# Patient Record
Sex: MALE | Race: WHITE | URBAN - METROPOLITAN AREA
[De-identification: names, ages, dates, MRNs, and addresses within clinical notes are randomized per-mention and may not be internally consistent; named-entity substitution may affect disease eponyms.]

---

## 2018-04-06 ENCOUNTER — OFFICE VISIT (OUTPATIENT)
Dept: ORTHOPEDIC SURGERY | Age: 53
End: 2018-04-06

## 2018-04-06 VITALS — HEIGHT: 74 IN | BODY MASS INDEX: 35.68 KG/M2 | WEIGHT: 278 LBS

## 2018-04-06 DIAGNOSIS — M17.0 PRIMARY OSTEOARTHRITIS OF BOTH KNEES: ICD-10-CM

## 2018-04-06 DIAGNOSIS — M25.562 PAIN IN BOTH KNEES, UNSPECIFIED CHRONICITY: Primary | ICD-10-CM

## 2018-04-06 DIAGNOSIS — M25.561 PAIN IN BOTH KNEES, UNSPECIFIED CHRONICITY: Primary | ICD-10-CM

## 2018-04-06 PROCEDURE — 99203 OFFICE O/P NEW LOW 30 MIN: CPT | Performed by: ORTHOPAEDIC SURGERY

## 2018-04-06 RX ORDER — LISINOPRIL 20 MG/1
20 TABLET ORAL
COMMUNITY
Start: 2017-11-14

## 2018-04-06 RX ORDER — SODIUM PHOSPHATE,MONO-DIBASIC 19G-7G/118
ENEMA (ML) RECTAL
COMMUNITY
Start: 2013-11-26

## 2018-04-06 RX ORDER — CETIRIZINE HYDROCHLORIDE 10 MG/1
10 TABLET ORAL
COMMUNITY
Start: 2014-05-02

## 2018-04-06 RX ORDER — METFORMIN HYDROCHLORIDE 500 MG/1
TABLET, EXTENDED RELEASE ORAL
COMMUNITY
Start: 2017-11-14

## 2018-04-06 RX ORDER — SIMVASTATIN 20 MG
20 TABLET ORAL
COMMUNITY
Start: 2017-11-14

## 2018-04-06 RX ORDER — ASPIRIN 325 MG
325 TABLET ORAL
COMMUNITY
Start: 2017-05-04

## 2019-05-17 ENCOUNTER — OFFICE VISIT (OUTPATIENT)
Dept: ORTHOPEDIC SURGERY | Age: 54
End: 2019-05-17
Payer: COMMERCIAL

## 2019-05-17 VITALS — BODY MASS INDEX: 35.68 KG/M2 | WEIGHT: 278 LBS | HEIGHT: 74 IN

## 2019-05-17 DIAGNOSIS — M25.562 ACUTE PAIN OF LEFT KNEE: Primary | ICD-10-CM

## 2019-05-17 DIAGNOSIS — S83.242D TEAR OF MEDIAL MENISCUS OF LEFT KNEE, UNSPECIFIED TEAR TYPE, UNSPECIFIED WHETHER OLD OR CURRENT TEAR, SUBSEQUENT ENCOUNTER: ICD-10-CM

## 2019-05-17 PROCEDURE — 99214 OFFICE O/P EST MOD 30 MIN: CPT | Performed by: ORTHOPAEDIC SURGERY

## 2019-05-17 NOTE — PROGRESS NOTES
Chief Complaint    Pain (LEFT KNEE)      History of Present Illness:  Jaz Logan is a 47 y.o. male. He is here for evaluation of his left knee. States that he injured his left knee a few months ago while he is playing platform tennis. He states while he is playing he feels like at some point time he twisted his knee and his had persistent medial sided left knee pain ever since then. Does have some feelings of instability like his knee was hyperextended times when he is walking. He is getting some catching over the medial side of the knee. He does have some mild arthritis within the knee and recently did have Visco injections back in December of last year. He did get significant relief with those injections. States is been a definite change within the left knee since this new injury. Medical History:  Patient's medications, allergies, past medical, surgical, social and family histories were reviewed and updated as appropriate. Review of Systems:  Pertinent items are noted in HPI  Review of systems reviewed from Patient History Form dated on 5/17/19 and available in the patient's chart under the Media tab. Vital Signs:  Ht 6' 2.02\" (1.88 m)   Wt 278 lb (126.1 kg)   BMI 35.68 kg/m²     General Exam:   Constitutional: Patient is adequately groomed with no evidence of malnutrition  DTRs: Deep tendon reflexes are intact  Mental Status: The patient is oriented to time, place and person. The patient's mood and affect are appropriate. Lymphatic: The lymphatic examination bilaterally reveals all areas to be without enlargement or induration. Knee Examination:    Inspection:  No significant swelling erythema noted to the left knee today    Palpation:  Is tenderness palpation primarily along the medial joint line. Mild palpable effusion today within the knee.     Range of Motion:  Extension of the knee is 0° and painful at full extension, knee flexion is 130°    Strength:  He is able to do a straight leg raise    Special Tests:  Negative Lachman exam.  No instability to varus and valgus stress testing. Negative posterior drawer. There is pain reproduction with [facility] exam    Skin: There are no rashes, ulcerations or lesions. Gait: He is walking with an antalgic gait      Additional Comments:       Additional Examinations:         Right Lower Extremity: Examination of the right lower extremity does not show any tenderness, deformity or injury. Range of motion is unremarkable. There is no gross instability. There are no rashes, ulcerations or lesions. Strength and tone are normal.  Thoracic Spine: Examination of the thoracic spine does not show any tenderness, deformity or injury. Range of motion is unremarkable. There is no gross instability. There are no rashes, ulcerations or lesions. Strength and tone are normal.  Lower Back: Examination of the lower back does not show any tenderness, deformity or injury. Range of motion is unremarkable. There is no gross instability. There are no rashes, ulcerations or lesions. Strength and tone are normal.    Radiology:     X-rays obtained and reviewed in office:  Views 4 views of left knee does demonstrate some mild narrowing primarily within the medial joint space with osteophyte formation. He does have some mild narrowing of that medial joint space. Assessment :  Left knee concern for medial meniscus tear    Impression:  Encounter Diagnoses   Name Primary?  Acute pain of left knee Yes    Tear of medial meniscus of left knee, unspecified tear type, unspecified whether old or current tear, subsequent encounter        Office Procedures:  Orders Placed This Encounter   Procedures    XR KNEE LEFT (MIN 4 VIEWS)     Standing Status:   Future     Number of Occurrences:   1     Standing Expiration Date:   5/17/2020       Treatment Plan:  I discussed the diagnosis and treatment options with him today.   I would recommend at this time getting an MRI of the knee to rule out medial meniscus tear. He is agreeable with that plan. I'll see him back once MRI is completed to go over those results. He will continue use ice and anti-inflammatories as needed.

## 2019-08-09 ENCOUNTER — OFFICE VISIT (OUTPATIENT)
Dept: ORTHOPEDIC SURGERY | Age: 54
End: 2019-08-09
Payer: COMMERCIAL

## 2019-08-09 VITALS — HEIGHT: 74 IN | WEIGHT: 278 LBS | BODY MASS INDEX: 35.68 KG/M2 | RESPIRATION RATE: 16 BRPM

## 2019-08-09 DIAGNOSIS — M17.12 PRIMARY OSTEOARTHRITIS OF LEFT KNEE: Primary | ICD-10-CM

## 2019-08-09 PROCEDURE — 99213 OFFICE O/P EST LOW 20 MIN: CPT | Performed by: ORTHOPAEDIC SURGERY

## 2019-08-09 NOTE — PROGRESS NOTES
compartment.       A complex tear of the posterior horn and body of the medial meniscus is present with a radial    component within the posterior horn (coronal PD SPIR series 401, image 7).     A probable small oblique undersurface tear of the posterior horn body junction of the lateral    meniscus is present (sagittal T1 series 501, image 7, corresponding sagittal PD SPIR series    601, image 6 and coronal PD SPIR series 401, image 16).     The ACL, PCL, medial and lateral collateral ligaments and extensor mechanism are intact.       A large knee effusion is present.  No Baker's cyst.                                           CONCLUSION:   1. Medial compartmental osteoarthritis with high-grade chondromalacia within the weightbearing    medial femoral condyle and medial aspect of the medial tibial plateau with underlying    subchondral bone marrow reaction and small-to-moderate-sized medial compartmental spurs. 2. Complex tear of the posterior horn and body of the medial meniscus. 3. Patellofemoral osteoarthritis with high-grade chondromalacia involving the trochlear    articular cartilage with mild underlying subchondral bone marrow reaction and moderate-grade    chondromalacia involving the patellar apex with mild patellofemoral spurring. 4. Probable small oblique undersurface tear of the posterior horn body junction of the lateral    meniscus. 5. Large knee effusion. Assessment : Left knee osteoarthritis    Impression:  Encounter Diagnosis   Name Primary?  Primary osteoarthritis of left knee Yes       Office Procedures:  No orders of the defined types were placed in this encounter. Treatment Plan: Primary finding on the MRI is that he does have more severe arthritis than what we could see on x-ray.   I would recommend at this time treatment for arthritis with continuation of low impact exercise as well as cortisone and Visco injections as needed as well as use of anti-inflammatories. He wants to consider repeat Visco injections as he did have last December and does feel like they have been helpful.   He will call us if he does wish to proceed

## 2019-09-24 DIAGNOSIS — M17.12 PRIMARY OSTEOARTHRITIS OF LEFT KNEE: Primary | ICD-10-CM

## 2019-10-03 ENCOUNTER — TELEPHONE (OUTPATIENT)
Dept: ORTHOPEDIC SURGERY | Age: 54
End: 2019-10-03

## 2020-01-08 ENCOUNTER — TELEPHONE (OUTPATIENT)
Dept: ORTHOPEDIC SURGERY | Age: 55
End: 2020-01-08

## 2023-02-23 ENCOUNTER — ANESTHESIA EVENT (OUTPATIENT)
Dept: ENDOSCOPY | Age: 58
End: 2023-02-23
Payer: COMMERCIAL

## 2023-02-23 ENCOUNTER — ANESTHESIA (OUTPATIENT)
Dept: ENDOSCOPY | Age: 58
End: 2023-02-23
Payer: COMMERCIAL

## 2023-02-23 ENCOUNTER — HOSPITAL ENCOUNTER (OUTPATIENT)
Age: 58
Setting detail: OUTPATIENT SURGERY
Discharge: HOME | End: 2023-02-23
Attending: INTERNAL MEDICINE | Admitting: INTERNAL MEDICINE
Payer: COMMERCIAL

## 2023-02-23 VITALS
HEART RATE: 72 BPM | DIASTOLIC BLOOD PRESSURE: 77 MMHG | SYSTOLIC BLOOD PRESSURE: 147 MMHG | OXYGEN SATURATION: 97 % | RESPIRATION RATE: 16 BRPM | TEMPERATURE: 97.6 F | WEIGHT: 280 LBS | BODY MASS INDEX: 35.94 KG/M2 | HEIGHT: 74 IN

## 2023-02-23 DIAGNOSIS — Z86.010 HISTORY OF COLON POLYPS: ICD-10-CM

## 2023-02-23 PROCEDURE — 88305 TISSUE EXAM BY PATHOLOGIST: CPT

## 2023-02-23 PROCEDURE — 2580000003 HC RX 258: Performed by: FAMILY MEDICINE

## 2023-02-23 PROCEDURE — 3700000000 HC ANESTHESIA ATTENDED CARE: Performed by: INTERNAL MEDICINE

## 2023-02-23 PROCEDURE — 3609010600 HC COLONOSCOPY POLYPECTOMY SNARE/COLD BIOPSY: Performed by: INTERNAL MEDICINE

## 2023-02-23 PROCEDURE — 7100000010 HC PHASE II RECOVERY - FIRST 15 MIN: Performed by: INTERNAL MEDICINE

## 2023-02-23 PROCEDURE — 7100000011 HC PHASE II RECOVERY - ADDTL 15 MIN: Performed by: INTERNAL MEDICINE

## 2023-02-23 PROCEDURE — 3700000001 HC ADD 15 MINUTES (ANESTHESIA): Performed by: INTERNAL MEDICINE

## 2023-02-23 PROCEDURE — 2709999900 HC NON-CHARGEABLE SUPPLY: Performed by: INTERNAL MEDICINE

## 2023-02-23 PROCEDURE — 6360000002 HC RX W HCPCS: Performed by: NURSE ANESTHETIST, CERTIFIED REGISTERED

## 2023-02-23 RX ORDER — SODIUM CHLORIDE, SODIUM LACTATE, POTASSIUM CHLORIDE, CALCIUM CHLORIDE 600; 310; 30; 20 MG/100ML; MG/100ML; MG/100ML; MG/100ML
INJECTION, SOLUTION INTRAVENOUS CONTINUOUS
Status: DISCONTINUED | OUTPATIENT
Start: 2023-02-23 | End: 2023-02-23 | Stop reason: HOSPADM

## 2023-02-23 RX ORDER — LIDOCAINE HYDROCHLORIDE 20 MG/ML
INJECTION, SOLUTION INTRAVENOUS PRN
Status: DISCONTINUED | OUTPATIENT
Start: 2023-02-23 | End: 2023-02-23 | Stop reason: SDUPTHER

## 2023-02-23 RX ORDER — PROPOFOL 10 MG/ML
INJECTION, EMULSION INTRAVENOUS CONTINUOUS PRN
Status: DISCONTINUED | OUTPATIENT
Start: 2023-02-23 | End: 2023-02-23 | Stop reason: SDUPTHER

## 2023-02-23 RX ORDER — PROPOFOL 10 MG/ML
INJECTION, EMULSION INTRAVENOUS PRN
Status: DISCONTINUED | OUTPATIENT
Start: 2023-02-23 | End: 2023-02-23 | Stop reason: SDUPTHER

## 2023-02-23 RX ADMIN — PROPOFOL 150 MCG/KG/MIN: 10 INJECTION, EMULSION INTRAVENOUS at 12:52

## 2023-02-23 RX ADMIN — PROPOFOL 50 MG: 10 INJECTION, EMULSION INTRAVENOUS at 12:53

## 2023-02-23 RX ADMIN — PROPOFOL 100 MG: 10 INJECTION, EMULSION INTRAVENOUS at 12:52

## 2023-02-23 RX ADMIN — PROPOFOL 50 MG: 10 INJECTION, EMULSION INTRAVENOUS at 12:55

## 2023-02-23 RX ADMIN — SODIUM CHLORIDE, POTASSIUM CHLORIDE, SODIUM LACTATE AND CALCIUM CHLORIDE: 600; 310; 30; 20 INJECTION, SOLUTION INTRAVENOUS at 11:27

## 2023-02-23 RX ADMIN — LIDOCAINE HYDROCHLORIDE 60 MG: 20 INJECTION, SOLUTION INTRAVENOUS at 12:52

## 2023-02-23 ASSESSMENT — PAIN - FUNCTIONAL ASSESSMENT: PAIN_FUNCTIONAL_ASSESSMENT: 0-10

## 2023-02-23 NOTE — DISCHARGE INSTRUCTIONS
ENDOSCOPY DISCHARGE INSTRUCTIONS:    Call the physician that did your procedure for any questions or concern:    [facility]: [phone]  DR. DIANE CEE      ACTIVITY:    There are potential side effects to the medications used for sedation and anesthesia during your procedure. These include:  Dizziness or light-headedness, confusion or memory loss, delayed reaction times, loss of coordination, nausea and vomiting. Because of your increased risk for injury, we ask that you observe the following precautions: For the next 24 hours,  DO NOT operate an automobile, bicycle, motorcycle, , power tools or large equipment of any kind. Do not drink alcohol, sign any legal documents or make any legal decisions for 24 hours. Do not bend your head over lower than your heart. DO sit on the side of bed/couch awhile before getting up. Plan on bedrest or quiet relaxation today. You may resume normal activities in 24 hours. DIET:    Your first meal today should be light, avoiding spicy and fatty foods. If you tolerate this first meal, then you may advance to your regular diet unless otherwise advised by your physician. NORMAL SYMPTOMS:  -Mild sore throat if youve had an EGD   -Gaseous discomfort    NOTIFY YOUR PHYSICIAN IF THESE SYMPTOMS OCCUR:  1. Fever (greater than 100)  5. Increased abdominal bloating  2. Severe pain    6. Excessive bleeding  3. Nausea and vomiting  7. Chest pain                                                                    4. Chills    8. Shortness of breath    ADDITIONAL INSTRUCTIONS:    Biopsy results: Call [address],[facility] for biopsy results in 1 week    Educational Information:    POSTOPERATIVE DIAGNOSIS:    -4 small colon polyps removed and retrieved with cold snare.  -Mild sigmoid diverticulosis. -Otherwise unremarkable exam to the cecum.      PLAN:   -We will follow-up with biopsy results and update the patient in a week or 2 via the patient portal.  Anticipate repeat colonoscopy in 3 years assuming favorable biopsy results.  -High-fiber diet  -Okay to resume Eliquis tomorrow        Please review these discharge instructions this evening or tomorrow for  information you may have forgotten. We want to thank you for choosing the [facility] as your health care provider. We always strive to provide you with excellent care while you are here. You may receive a survey in the mail regarding your care. We would appreciate you taking a few minutes of your time to complete this survey.

## 2023-02-23 NOTE — ANESTHESIA POSTPROCEDURE EVALUATION
Department of Anesthesiology  Postprocedure Note    Patient: Tamra Rodrigues  MRN: [id number]  YOB: 1965  Date of evaluation: 2/23/2023      Procedure Summary     Date: 02/23/23 Room / Location: [place] [address] / [facility]    Anesthesia Start: 3032 Anesthesia Stop: 6965    Procedure: COLONOSCOPY POLYPECTOMY SNARE/COLD BIOPSY Diagnosis:       History of colon polyps      (History of colon polyps [Z86.010])    Surgeons: Yosi Chen MD Responsible Provider: Caroline Vargas MD    Anesthesia Type: MAC ASA Status: 2          Anesthesia Type: No value filed.     Francisca Phase I: Francisca Score: 10    Francisca Phase II: Francisca Score: 10      Anesthesia Post Evaluation    Patient location during evaluation: PACU  Level of consciousness: awake  Complications: no  Multimodal analgesia pain management approach

## 2023-02-23 NOTE — PROGRESS NOTES
Ambulatory Surgery/Procedure Discharge Note    Vitals:    02/23/23 1347   BP: (!) 147/77   Pulse: 72   Resp: 16   Temp:    SpO2: 97%   T 97.6 temporal  Pt neets criteria per Francisca score. In: 800 [I.V.:800]  Out: -     Restroom use offered before discharge. Yes    Pain assessment:  none  Pain Level: 0    Pt and S.O./family states \"ready to go home\". Pt alert and oriented x4. IV removed. Denies N/V or pain. Discharge instructions given to pt and wife with pt permission. Pt and wife verbalized understanding of all instructions. Left with all belongings and discharge instructions. Patient discharged to home/self care.  Patient discharged via wheel chair by transporter to waiting family/S.O.       2/23/2023 1:49 PM

## 2023-02-23 NOTE — PROCEDURES
COLONOSCOPY     Patient: Chencho Flowers MRN: [id number]   YOB: 1965 Age: 65 y.o. Sex: male   Unit: [facility] ENDOSCOPY Room/Bed: [facility]/NONE Location: [address] [place] [address]    Admitting Physician: Jodi Whitehead     Primary Care Physician: Herbert Ledezma      DATE OF PROCEDURE: 2/23/2023  PROCEDURE: Colonoscopy    PREOPERATIVE DIAGNOSIS: History of colon polyps [Z86.010]  HPI: Screening colonoscopy, referred by Dr. Iris Dillard. 52-year-old man history of DM, DVT on lifelong Eliquis, personal history of colon polyps. His first colonoscopy was in 2016 with 3 small or medium sized polyps. Last colonoscopy was in 2019 with similar findings and a 3-year recall recommended. He denies any GI complaints. There     ENDOSCOPIST: Xander Noland MD    POSTOPERATIVE DIAGNOSIS:    -4 small colon polyps removed and retrieved with cold snare.  -Mild sigmoid diverticulosis. -Otherwise unremarkable exam to the cecum. PLAN:   -We will follow-up with biopsy results and update the patient in a week or 2 via the patient portal.  Anticipate repeat colonoscopy in 3 years assuming favorable biopsy results.  -High-fiber diet  -Okay to resume Eliquis tomorrow. INFORMED CONSENT:  Informed consent for colonoscopy was obtained. The benefits and risks including adverse medicine reaction and perforation have been explained. The patient's questions were answered and the patient agreed to proceed. ASA: ASA 3 - Patient with moderate systemic disease with functional limitations     SEDATION: MAC    The patient's vital signs, cardiac status, pulmonary status, abdominal status and mental status were stable for the procedure. The patient's vital signs and respiratory function as monitored by oxygen saturation remained stable. COLON PREPARATION:  The patient was given a split colon preparation and the preparation was adequate. Procedure Details: An anal exam was performed and this was unremarkable.  A digital rectal exam was performed and no masses palpated. The Olympus videocolonoscope  was inserted in the rectum and carefully advanced to the cecum as identified by IC valve, crow's foot appearance and appendix. The cecum was photodocumented. The colonoscope was slowly withdrawn and retrograde examination of the colon was carefully performed with inspection around and between folds. The ascending colon and cecum were intubated twice with repeat antegrade and retrograde examination. Retroflexion was performed in the right colon. Retroflexion in the rectum was performed. Cecum Intubated: Yes    Findings: The cecum including the appendix and ileocecal valve are notable for a small 2 mm cecal polyp removed and retrieved with a cold snare. The ascending colon contained a small 3 mm polyp removed and retrieved with a cold snare. In the transverse colon, there was another small 3 mm polyp removed and retrieved with a cold snare. There was mild sigmoid diverticulosis. In the sigmoid there was also a small 3 mm polyp removed and retrieved with a cold snare.     Estimated Blood Loss:  Minimal   Complications: None    Signed By: Litzy Bacon MD

## 2023-02-23 NOTE — ANESTHESIA PRE PROCEDURE
Department of Anesthesiology  Preprocedure Note       Name:  Benjamin Mesa   Age:  65 y.o.  :  1965                                          MRN:  [id number]         Date:  2023      Surgeon: Judy Beauchamp):  Justin Montana MD    Procedure: Procedure(s):  COLONOSCOPY    Medications prior to admission:   Prior to Admission medications    Medication Sig Start Date End Date Taking? Authorizing Provider   aspirin 325 MG tablet Take 325 mg by mouth  Patient not taking: Reported on 2023   Historical Provider, MD   cetirizine (ZYRTEC) 10 MG tablet Take 10 mg by mouth 14   Historical Provider, MD   apixaban (ELIQUIS) 5 MG TABS tablet TAKE 1 TABLET TWICE A DAY 3/29/18   Historical Provider, MD   glucosamine-chondroitin 500-400 MG CAPS 3 po qd  Patient not taking: Reported on 13   Historical Provider, MD   lisinopril (PRINIVIL;ZESTRIL) 20 MG tablet Take 20 mg by mouth 17   Historical Provider, MD   metFORMIN (GLUCOPHAGE-XR) 500 MG extended release tablet 2 po BID 17   Historical Provider, MD   simvastatin (ZOCOR) 20 MG tablet Take 20 mg by mouth 17   Historical Provider, MD   Omega-3 Fatty Acids (FISH OIL PO) Take by mouth  Patient not taking: Reported on 2023    Historical Provider, MD       Current medications:    Current Facility-Administered Medications   Medication Dose Route Frequency Provider Last Rate Last Admin    lactated ringers IV soln infusion   IntraVENous Continuous Katelynn Lopez MD 50 mL/hr at 23 1127 New Bag at 23 1127       Allergies:  No Known Allergies    Problem List:  There is no problem list on file for this patient.       Past Medical History:        Diagnosis Date    Diabetes mellitus ([address]     Hyperlipidemia     Hypertension        Past Surgical History:        Procedure Laterality Date    COLONOSCOPY      KNEE ARTHROSCOPY Right        Social History:    Social History     Tobacco Use    Smoking status: Never    Smokeless tobacco: Never   Substance Use Topics    Alcohol use: Yes     Comment: 15 servings a week                                Counseling given: Not Answered      Vital Signs (Current):   Vitals:    02/23/23 1054   BP: (!) 162/83   Pulse: 73   Resp: 16   Temp: 97.6 °F (36.4 °C)   TempSrc: Temporal   SpO2: 98%   Weight: 280 lb (127 kg)   Height: 6' 2\" (1.88 m)                                              BP Readings from Last 3 Encounters:   02/23/23 (!) 162/83       NPO Status: Time of last liquid consumption: 0700                        Time of last solid consumption: 1700                        Date of last liquid consumption: 02/23/23                        Date of last solid food consumption: 02/21/23    BMI:   Wt Readings from Last 3 Encounters:   02/23/23 280 lb (127 kg)   08/09/19 278 lb (126.1 kg)   05/17/19 278 lb (126.1 kg)     Body mass index is 35.95 kg/m². CBC: No results found for: WBC, RBC, HGB, HCT, MCV, RDW, PLT    CMP: No results found for: NA, K, CL, CO2, BUN, CREATININE, GFRAA, AGRATIO, LABGLOM, GLUCOSE, GLU, PROT, CALCIUM, BILITOT, ALKPHOS, AST, ALT    POC Tests: No results for input(s): POCGLU, POCNA, POCK, POCCL, POCBUN, POCHEMO, POCHCT in the last 72 hours.     Coags: No results found for: PROTIME, INR, APTT    HCG (If Applicable): No results found for: PREGTESTUR, PREGSERUM, HCG, HCGQUANT     ABGs: No results found for: PHART, PO2ART, FHV4BHT, KEH9PXE, BEART, R1CSCDOX     Type & Screen (If Applicable):  No results found for: LABABO, LABRH    Drug/Infectious Status (If Applicable):  No results found for: HIV, HEPCAB    COVID-19 Screening (If Applicable): No results found for: COVID19        Anesthesia Evaluation    Airway: Mallampati: II  TM distance: >3 FB   Neck ROM: full  Mouth opening: > = 3 FB   Dental:          Pulmonary:                              Cardiovascular:    (+) hypertension:,         Rhythm: regular  Rate: normal                    Neuro/Psych: GI/Hepatic/Renal:             Endo/Other:    (+) Diabetes, . Abdominal:             Vascular: Other Findings:           Anesthesia Plan      MAC     ASA 2       Induction: intravenous. Anesthetic plan and risks discussed with patient. Plan discussed with CRNA.                     Oskar Puckett MD   2/23/2023

## (undated) DEVICE — TRAP SPEC RETRV CLR PLAS POLYP IN LN SUCT QUIK CTCH

## (undated) DEVICE — CANNULA SAMP CO2 AD GRN 7FT CO2 AND 7FT O2 TBNG UNIV CONN

## (undated) DEVICE — SNARES COLD OVAL 10MM THIN